# Patient Record
Sex: FEMALE | Race: WHITE | URBAN - METROPOLITAN AREA
[De-identification: names, ages, dates, MRNs, and addresses within clinical notes are randomized per-mention and may not be internally consistent; named-entity substitution may affect disease eponyms.]

---

## 2022-01-10 ENCOUNTER — OFFICE VISIT (OUTPATIENT)
Dept: URGENT CARE | Age: 67
End: 2022-01-10
Payer: MEDICARE

## 2022-01-10 VITALS
HEIGHT: 67 IN | RESPIRATION RATE: 12 BRPM | HEART RATE: 80 BPM | SYSTOLIC BLOOD PRESSURE: 119 MMHG | DIASTOLIC BLOOD PRESSURE: 78 MMHG | WEIGHT: 155 LBS | TEMPERATURE: 98.6 F | BODY MASS INDEX: 24.33 KG/M2 | OXYGEN SATURATION: 100 %

## 2022-01-10 DIAGNOSIS — Z11.52 ENCOUNTER FOR SCREENING FOR COVID-19: Primary | ICD-10-CM

## 2022-01-10 PROCEDURE — 99212 OFFICE O/P EST SF 10 MIN: CPT | Performed by: NURSE PRACTITIONER

## 2022-01-10 NOTE — PROGRESS NOTES
Kiara Mclaughlin (:  1955) is a 73 y.o. female,Established patient, here for evaluation of the following chief complaint(s):  Pharyngitis         ASSESSMENT/PLAN:  1. Encounter for screening for COVID-19  No results found for: COVID19, FLUAPOC, FLUBPOC, STREPAAG                      No follow-ups on file. Subjective   SUBJECTIVE/OBJECTIVE:  HPI    Review of Systems       Objective   Physical Exam         An electronic signature was used to authenticate this note.     --Clint Wilkerson, TELMA - CNP

## 2022-01-13 DIAGNOSIS — J02.9 PHARYNGITIS, UNSPECIFIED ETIOLOGY: Primary | ICD-10-CM

## 2022-01-13 RX ORDER — AZITHROMYCIN 250 MG/1
250 TABLET, FILM COATED ORAL DAILY
Qty: 6 TABLET | Refills: 0 | OUTPATIENT
Start: 2022-01-10 | End: 2022-01-15